# Patient Record
Sex: MALE | Race: BLACK OR AFRICAN AMERICAN
[De-identification: names, ages, dates, MRNs, and addresses within clinical notes are randomized per-mention and may not be internally consistent; named-entity substitution may affect disease eponyms.]

---

## 2017-02-07 ENCOUNTER — APPOINTMENT (OUTPATIENT)
Dept: UROLOGY | Facility: CLINIC | Age: 62
End: 2017-02-07

## 2017-02-07 DIAGNOSIS — N52.9 MALE ERECTILE DYSFUNCTION, UNSPECIFIED: ICD-10-CM

## 2018-02-06 ENCOUNTER — APPOINTMENT (OUTPATIENT)
Dept: UROLOGY | Facility: CLINIC | Age: 63
End: 2018-02-06
Payer: MEDICAID

## 2018-02-06 VITALS
HEIGHT: 70 IN | WEIGHT: 170 LBS | DIASTOLIC BLOOD PRESSURE: 69 MMHG | SYSTOLIC BLOOD PRESSURE: 109 MMHG | RESPIRATION RATE: 15 BRPM | TEMPERATURE: 98 F | HEART RATE: 69 BPM | BODY MASS INDEX: 24.34 KG/M2

## 2018-02-06 PROCEDURE — 99214 OFFICE O/P EST MOD 30 MIN: CPT

## 2018-02-08 LAB — PSA SERPL-MCNC: 0.01 NG/ML

## 2019-02-05 ENCOUNTER — APPOINTMENT (OUTPATIENT)
Dept: UROLOGY | Facility: CLINIC | Age: 64
End: 2019-02-05
Payer: MEDICAID

## 2019-02-05 PROCEDURE — 99214 OFFICE O/P EST MOD 30 MIN: CPT

## 2019-02-05 NOTE — LETTER BODY
[FreeTextEntry1] : Maylin Covarrubias MD\par [address]\[place] [address]\par \par Dear Dr. Covarrubias,\par \par Román Brambila returns to the office today. He is a 63-year-old man status post radical prostatectomy for treatment of clinically localized prostate cancer performed in 2011. He has remained without any evidence of disease recurrence. He recently had his PSA checked in early January this year which was undetectable. He has very good urinary control without requirement for any pads and there is no incontinence. He has had some residual issues with erectile dysfunction after the prostatectomy. In the past he has used both oral medication as well as injectable medication but clearly had better results with the injection therapy. He is still interested in potentially having treatment with injection therapy but has not used it in some time.\par \par The patient reports a normal appetite. He denies abdominal pain or flank pain. He has no fevers or chills and denies any nausea or vomiting. He has no urinary complaints such as hematuria, dysuria, urgency, frequency, or incontinence. His weight is stable. There has been no unintentional weight loss. He has recently started cholesterol medication related to recently performed blood work showing elevation of the cholesterol level.\par \par We reviewed his PSA level today which is undetectable. He should have this done on an annual basis. We had a detailed discussion regarding management of her recti dysfunction and he would like to try the injection therapy again. I have given him a prescription for this and he will return to the office for injection teaching as he is unsure if he remembers exactly how to perform the self  administration. I will see him back at that time and otherwise I will see him back annually for the prostate cancer followup.\par \par Please do not hesitate to contact me with any questions or concerns.\par \par Sincerely,\par \par \par \par \par Mateo Tillman MD, FACS\par  of Urology\par [facility]\par \par [facility]\par Director of Robotics and Minimally Invasive Surgery\par [address] [place] [address]\[place] [address]\par P: [phone]\par F: [phone]\par [email or web address]\par

## 2020-02-11 ENCOUNTER — APPOINTMENT (OUTPATIENT)
Dept: UROLOGY | Facility: CLINIC | Age: 65
End: 2020-02-11

## 2020-02-28 ENCOUNTER — APPOINTMENT (OUTPATIENT)
Dept: UROLOGY | Facility: CLINIC | Age: 65
End: 2020-02-28
Payer: MEDICAID

## 2020-02-28 PROCEDURE — 99213 OFFICE O/P EST LOW 20 MIN: CPT

## 2021-02-16 ENCOUNTER — APPOINTMENT (OUTPATIENT)
Dept: UROLOGY | Facility: CLINIC | Age: 66
End: 2021-02-16

## 2021-03-12 ENCOUNTER — APPOINTMENT (OUTPATIENT)
Dept: UROLOGY | Facility: CLINIC | Age: 66
End: 2021-03-12
Payer: MEDICAID

## 2021-03-12 PROCEDURE — 99213 OFFICE O/P EST LOW 20 MIN: CPT

## 2021-03-12 PROCEDURE — 99072 ADDL SUPL MATRL&STAF TM PHE: CPT

## 2021-03-12 RX ORDER — PAPAVERINE HYDROCHLORIDE 30 MG/ML
30 INJECTION, SOLUTION INTRAVENOUS
Qty: 2.5 | Refills: 11 | Status: COMPLETED | COMMUNITY
Start: 2019-02-05 | End: 2021-03-12

## 2021-03-12 NOTE — LETTER BODY
[FreeTextEntry1] : Maylin Covarrubias MD\par [address]\[place] [address]\par \par Dear Dr. Covarrubias,\par \par Román Brambila returns to the office today.  He is a 65-year-old man with history of prostate cancer.  He underwent radical prostatectomy in 2011 and has been free of any disease recurrence since that time with undetectable PSAs.  His most recent PSA was done in February of this year.  He has had good recovery of his urinary control and erectile dysfunction.  He continues to have no leakage of urine but says that since he underwent an emergency appendectomy when he was traveling in [place], he developed reduction of his libido and erectile dysfunction again.  He has not been on any medication since that procedure for erectile dysfunction.the patient did also develop what sounds to be a DVT after his appendectomy and was treated with Xarelto for about 6 months which he is no longer using.\par \par He reports continued lack of good erectile function.  He does have some partial erections if he tries to stimulate the penis but nothing that he would deem adequate for penetration.\par \par The patient continues to be free of prostate cancer recurrence.  It is unclear how his appendectomy may be associated with the erectile dysfunction, but the patient clearly has the association and time without surgery and postoperative erectile dysfunction.  He would like to see a specialist with men's sexual health for further discussion and evaluation.  I have referred him to a colleague for that purpose.\par \par Sincerely,\par \par \par \par \par Mateo Tillman MD, FACS\par  of Urology\par Residency \par [facility]\par \par [facility]\par Director of Robotics and Minimally Invasive Surgery\par [address] [place] [address]\[place] [address]\par P: [phone]\par F: [phone]\par [facility]\par \par

## 2021-04-30 ENCOUNTER — APPOINTMENT (OUTPATIENT)
Dept: UROLOGY | Facility: CLINIC | Age: 66
End: 2021-04-30
Payer: MEDICAID

## 2021-04-30 VITALS
HEART RATE: 72 BPM | BODY MASS INDEX: 26.07 KG/M2 | TEMPERATURE: 97.3 F | SYSTOLIC BLOOD PRESSURE: 133 MMHG | OXYGEN SATURATION: 95 % | DIASTOLIC BLOOD PRESSURE: 78 MMHG | HEIGHT: 68 IN | WEIGHT: 172 LBS

## 2021-04-30 PROCEDURE — 99072 ADDL SUPL MATRL&STAF TM PHE: CPT

## 2021-04-30 PROCEDURE — 99214 OFFICE O/P EST MOD 30 MIN: CPT

## 2021-04-30 NOTE — HISTORY OF PRESENT ILLNESS
[FreeTextEntry1] :  Had prostatectomy 12 years ago with Dr. Tillman \par had appendectomy 4 years ago \par DVT back then - treated for 6 months \par used to use injections in the past but nothing for last 3 years\par \par had left testis mass

## 2021-04-30 NOTE — ASSESSMENT
[FreeTextEntry1] : ED\par - try cialis 5/20 \par - DUS\par - trimix send \par - scrotal US for left epididymal cyst ?\par \par The submitted E/M billing level for this visit reflects the total time spent on the day of the visit including documentation in EMR, face-to-face time spent with the patient, non-face-to-face review of medical records and relevant information, review of laboratory results available via [employer] portal, as well using a patient’s electronic medical records portal for patients with records not available to [facility] directly. \par \par Time spend counseling and performing coordination of care was also included in determining the appropriate EM billing level.\par \par I have reviewed and verified information regarding the chief complaint and history recorded by the ancillary staff and/or the patient. I have independently reviewed and interpreted tests performed by other physicians and facilities as necessary. I have reviewed images pertinent to providing the care to  the patient.  \par \par I have discussed with the patient differential diagnosis, reason for auxiliary tests if ordered, risks, benefits, alternatives, and complications of each form of therapy included surgical therapy. Off label use of most of medications used in andrology was reviewed. \par \par  \par

## 2021-04-30 NOTE — ASSESSMENT
[FreeTextEntry1] : ED\par - try cialis 5/20 \par - DUS\par - trimix send \par - scrotal US for left epididymal cyst ?\par \par The submitted E/M billing level for this visit reflects the total time spent on the day of the visit including documentation in EMR, face-to-face time spent with the patient, non-face-to-face review of medical records and relevant information, review of laboratory results available via [employer] portal, as well using a patient’s electronic medical records portal for patients with records not available to [facility] directly. \par \par Time spend counseling and performing coordination of care was also included in determining the appropriate EM billing level.\par \par I have reviewed and verified information regarding the chief complaint and history recorded by the ancillary staff and/or the patient. I have independently reviewed and interpreted tests performed by other physicians and facilities as necessary. I have reviewed images pertinent to providing the care to  the patient.  \par \par I have discussed with the patient differential diagnosis, reason for auxiliary tests if ordered, risks, benefits, alternatives, and complications of each form of therapy included surgical therapy. Off label use of most of medications used in andrology was reviewed. \par \par  \par

## 2021-06-01 ENCOUNTER — NON-APPOINTMENT (OUTPATIENT)
Age: 66
End: 2021-06-01

## 2021-06-21 ENCOUNTER — APPOINTMENT (OUTPATIENT)
Dept: UROLOGY | Facility: CLINIC | Age: 66
End: 2021-06-21
Payer: MEDICAID

## 2021-06-21 VITALS
HEART RATE: 70 BPM | TEMPERATURE: 97.1 F | OXYGEN SATURATION: 98 % | DIASTOLIC BLOOD PRESSURE: 81 MMHG | SYSTOLIC BLOOD PRESSURE: 134 MMHG

## 2021-06-21 DIAGNOSIS — N43.3 HYDROCELE, UNSPECIFIED: ICD-10-CM

## 2021-06-21 PROCEDURE — 93980 PENILE VASCULAR STUDY: CPT

## 2021-06-21 PROCEDURE — 54235 NJX CORPORA CAVERNOSA RX AGT: CPT

## 2021-06-21 PROCEDURE — J3490T: CUSTOM | Mod: NC

## 2021-06-25 ENCOUNTER — NON-APPOINTMENT (OUTPATIENT)
Age: 66
End: 2021-06-25

## 2021-07-19 ENCOUNTER — APPOINTMENT (OUTPATIENT)
Dept: UROLOGY | Facility: CLINIC | Age: 66
End: 2021-07-19
Payer: MEDICAID

## 2021-07-19 VITALS
HEART RATE: 72 BPM | HEIGHT: 70 IN | WEIGHT: 165 LBS | OXYGEN SATURATION: 100 % | BODY MASS INDEX: 23.62 KG/M2 | SYSTOLIC BLOOD PRESSURE: 136 MMHG | DIASTOLIC BLOOD PRESSURE: 79 MMHG

## 2021-07-19 PROCEDURE — 99213 OFFICE O/P EST LOW 20 MIN: CPT

## 2021-07-19 RX ORDER — AZITHROMYCIN 250 MG/1
250 TABLET, FILM COATED ORAL
Qty: 6 | Refills: 0 | Status: COMPLETED | COMMUNITY
Start: 2021-05-07 | End: 2021-07-19

## 2021-07-19 NOTE — ASSESSMENT
[FreeTextEntry1] : Explained can use Trimix up to 20u \par \par Explained penile anatomy and safe zones to inject.\par \par Refilled Trimix.\par \par The submitted E/M billing level for this visit reflects the total time spent on the day of the visit including face-to-face time spent with the patient, non-face-to-face review of medical records and relevant information, documentation, and asynchronous communication with the patient after a visit via phone, email, or patient’s eHR portal after the visit.  \par \par The medical records reviewed are either scanned into the chart or reviewed with the patient using a patient’s electronic medical records portal for patients with records not available to [facility] via electronic transmission platforms from other institutions and labs. \par \par Time spend counseling and performing coordination of care was also included in determining the appropriate EM billing level.\par \par I have reviewed and verified information regarding the chief complaint and history recorded by the ancillary staff and/or the patient. I have independently reviewed and interpreted tests performed by other physicians and facilities as necessary. \par \par I have discussed with the patient differential diagnosis, reason for auxiliary tests if ordered, risks, benefits, alternatives, and complications of each form of therapy were discussed. \par \par Intracavernosal penile injection teaching.\par Patient is here for the penile injection teaching.  He failed oral therapy for erectile dysfunction.\par \par We reviewed risks, benefits, alternatives, complications and the procedure itself.\par \par Specifically we discussed with the patient that the medication used for intracavernosal injection therapy consists of 3 or 4 different medication, specifically prostaglandin E1, papaverine, phentolamine, and occasionally very low dose of atropine.  The medication for penile injection therapy is prepared by the pharmacy.  Only special pharmacy is prepared at medication.  Each of the medication dilates vessels in the penis using different mechanisms.  Only one of the components of that preparation is approved by FDA for penile injection therapy however it has to be used in higher dose which can increase pain and intracavernosal scarring.  The approved component of the Trimix is prostaglandin E1.  It is also known as Caverject.\par \par The medication is injected by the patient in the shaft of the penis on the left or right side.  The appropriate site of the infection injection was demonstrated on the model of the penis.  Patient should not inject into the dorsal aspect of the penis or underneath the penis as he may inject into the vessels or urethra.\par \par The risk of prolonged correction, also numbness priapism was explained to the patient.  If patient has erection more than 2 hours he should take from 2-4 Sudafed's of 30 mg each total 120 mg.  Maximum dose of Sudafed per days 240 mg.  If the erection pressure persists still after Sudafed he needs to contact our office within working hours or go to the nearest ER.\par \par Patient should never have erection longer than 4 hours.  Irreversible damage to the penis can occur if priapism persists.  Patient understood the risk of priapism.\par \par Prescription was given.  Prescription for syringes was given as well.  Patient will contact us with any questions.  \par \par Total time spend teaching the patient 35 min

## 2021-07-19 NOTE — HISTORY OF PRESENT ILLNESS
[FreeTextEntry1] : ANKIT ARRIETA, a 65 year old male, presented to the office for a follow up regarding his erectile dysfunction.\par \par Said he went home from last visit and injected 10u of TriMix and did not work the first two times.\par \par The third time he tried he said it lasted 45 minutes.\par \par

## 2022-01-25 ENCOUNTER — APPOINTMENT (OUTPATIENT)
Dept: UROLOGY | Facility: CLINIC | Age: 67
End: 2022-01-25
Payer: MEDICAID

## 2022-01-25 VITALS
HEART RATE: 71 BPM | BODY MASS INDEX: 24.11 KG/M2 | DIASTOLIC BLOOD PRESSURE: 86 MMHG | SYSTOLIC BLOOD PRESSURE: 145 MMHG | OXYGEN SATURATION: 98 % | RESPIRATION RATE: 16 BRPM | WEIGHT: 168 LBS

## 2022-01-25 PROCEDURE — 99213 OFFICE O/P EST LOW 20 MIN: CPT

## 2022-01-25 NOTE — HISTORY OF PRESENT ILLNESS
[FreeTextEntry1] : ANKIT ARRIETA, a 66 year old male, presented to the office for a follow up regarding his erectile dysfunction.\par \par Patient using 20u of Trimix "with great results."\par \par Ruddy managing PSA

## 2022-01-25 NOTE — ASSESSMENT
[FreeTextEntry1] : ED\par -Trimix 20u working well\par -no reported scar tissue\par -rotates sites as directed\par -no refill needed - states has 1 refill left \par \par - refill done\par - explained that he doesn’t need to take Sudafed every time just if he has prolonged erection > 2 h\par \par I saw and evaluated the patient with nurse practitioner Rosendo Harley. \par I have confirmed the chief complaint, past medical, surgical, and social history.\par I have examined the patient. \par I have reviewed the note and interpreted auxiliary tests results. \par I have formulated the assessment and plan and discussed my recommendations of care to the nurse practitioner.\par I agree with the findings and the plan of care as documented by this  note which I edited as necessary.\par \par \par The submitted E/M billing level for this visit reflects the total time spent on the day of the visit including face-to-face time spent with the patient, non-face-to-face review of medical records and relevant information, documentation, and asynchronous communication with the patient after a visit via phone, email, or patient’s eHR portal after the visit.  \par \par The medical records reviewed are either scanned into the chart or reviewed with the patient using a patient’s electronic medical records portal for patients with records not available to [facility] via electronic transmission platforms from other institutions and labs. \par \par Time spend counseling and performing coordination of care was also included in determining the appropriate EM billing level.\par \par I have reviewed and verified information regarding the chief complaint and history recorded by the ancillary staff and/or the patient. I have independently reviewed and interpreted tests performed by other physicians and facilities as necessary. \par \par I have discussed with the patient differential diagnosis, reason for auxiliary tests if ordered, risks, benefits, alternatives, and complications of each form of therapy were discussed.

## 2022-04-08 ENCOUNTER — APPOINTMENT (OUTPATIENT)
Dept: UROLOGY | Facility: CLINIC | Age: 67
End: 2022-04-08
Payer: MEDICAID

## 2022-04-08 VITALS
TEMPERATURE: 97.6 F | SYSTOLIC BLOOD PRESSURE: 131 MMHG | HEART RATE: 86 BPM | DIASTOLIC BLOOD PRESSURE: 89 MMHG | BODY MASS INDEX: 22.62 KG/M2 | HEIGHT: 70 IN | WEIGHT: 158 LBS | RESPIRATION RATE: 17 BRPM

## 2022-04-08 VITALS
WEIGHT: 155 LBS | HEIGHT: 70 IN | HEART RATE: 78 BPM | TEMPERATURE: 97.6 F | DIASTOLIC BLOOD PRESSURE: 83 MMHG | RESPIRATION RATE: 17 BRPM | BODY MASS INDEX: 22.19 KG/M2 | SYSTOLIC BLOOD PRESSURE: 156 MMHG

## 2022-04-08 PROCEDURE — 99214 OFFICE O/P EST MOD 30 MIN: CPT

## 2022-04-08 RX ORDER — ASPIRIN 325 MG/1
TABLET, FILM COATED ORAL
Refills: 0 | Status: COMPLETED | COMMUNITY
End: 2022-04-08

## 2022-04-08 RX ORDER — CHOLECALCIFEROL (VITAMIN D3) 1250 MCG
1.25 MG CAPSULE ORAL
Refills: 0 | Status: ACTIVE | COMMUNITY

## 2022-04-08 RX ORDER — SIMVASTATIN 80 MG/1
TABLET, FILM COATED ORAL
Refills: 0 | Status: COMPLETED | COMMUNITY
End: 2022-04-08

## 2022-04-08 RX ORDER — TADALAFIL 5 MG/1
5 TABLET ORAL
Qty: 30 | Refills: 6 | Status: COMPLETED | OUTPATIENT
Start: 2021-04-30 | End: 2022-04-08

## 2022-04-08 RX ORDER — TADALAFIL 20 MG/1
20 TABLET ORAL
Qty: 30 | Refills: 6 | Status: COMPLETED | OUTPATIENT
Start: 2021-04-30 | End: 2022-04-08

## 2022-08-06 LAB
TESTOST BND SERPL-MCNC: 8.2 PG/ML
TESTOST SERPL-MCNC: 444.5 NG/DL

## 2023-01-26 ENCOUNTER — APPOINTMENT (OUTPATIENT)
Dept: UROLOGY | Facility: CLINIC | Age: 68
End: 2023-01-26

## 2023-01-31 ENCOUNTER — APPOINTMENT (OUTPATIENT)
Dept: UROLOGY | Facility: CLINIC | Age: 68
End: 2023-01-31
Payer: MEDICARE

## 2023-01-31 DIAGNOSIS — N50.3 CYST OF EPIDIDYMIS: ICD-10-CM

## 2023-01-31 NOTE — HISTORY OF PRESENT ILLNESS
[FreeTextEntry1] : ED follow up \par \par On ICI with trimix with good results \par no prolonged erections \par no scarring \par \par \par  Had prostatectomy 12 years ago with Dr. Tillman \par had appendectomy 4 years ago \par DVT back then - treated for 6 months \par used to use injections in the past but nothing for last 3 years\par \par had left testis mass

## 2023-02-07 ENCOUNTER — APPOINTMENT (OUTPATIENT)
Dept: UROLOGY | Facility: CLINIC | Age: 68
End: 2023-02-07
Payer: MEDICARE

## 2023-02-07 VITALS
OXYGEN SATURATION: 99 % | HEART RATE: 86 BPM | SYSTOLIC BLOOD PRESSURE: 143 MMHG | DIASTOLIC BLOOD PRESSURE: 73 MMHG | RESPIRATION RATE: 18 BRPM | TEMPERATURE: 98 F

## 2023-02-07 DIAGNOSIS — R39.15 URGENCY OF URINATION: ICD-10-CM

## 2023-02-07 PROCEDURE — 99214 OFFICE O/P EST MOD 30 MIN: CPT

## 2023-02-07 RX ORDER — PAPAVER/PHENTOLAMINE/ALPROSTAD 150-5-50
150-5-50 VIAL (EA) INTRACAVERNOSAL
Qty: 1 | Refills: 11 | Status: COMPLETED | COMMUNITY
Start: 2021-07-19 | End: 2023-02-07

## 2023-02-07 NOTE — ASSESSMENT
[FreeTextEntry1] : ED\par -Trimix 20u working well\par -no reported scar tissue\par -rotates sites as directed\par - needs refills for a year \par \par Patient also noticed some urinary urgency which is new to him.  He has no fever no chills.  He has no urinary incontinence.  We will check his urine and urine culture.  If this is due to infection I will treat it otherwise he will discuss with Dr. Tillman during his follow-up in April any further evaluation.  He will see me for erectile dysfunction only.  Follow-up in a year or earlier if he has any issues with achieving adequate erection for penetration.\par

## 2023-02-07 NOTE — PHYSICAL EXAM
[Urethral Meatus] : meatus normal [Penis Abnormality] : normal uncircumcised penis [Urinary Bladder Findings] : the bladder was normal on palpation [Scrotum] : the scrotum was normal [Testes Mass (___cm)] : there were no testicular masses

## 2023-02-07 NOTE — HISTORY OF PRESENT ILLNESS
[FreeTextEntry1] : The patient was seen and examined with CASPER George in the room. \par \par ED follow up \par \par On ICI with trimix with good results 20 units\par no prolonged erections \par no scarring \par noticed urgency\par \par  Had prostatectomy 12 years ago with Dr. Tillman \par had appendectomy 4 years ago \par DVT back then - treated for 6 months \par used to use injections in the past but nothing for last 3 years\par \par had left testis mass

## 2023-02-09 LAB
APPEARANCE: CLEAR
BILIRUBIN URINE: NEGATIVE
BLOOD URINE: NEGATIVE
COLOR: NORMAL
GLUCOSE QUALITATIVE U: NEGATIVE
KETONES URINE: NEGATIVE
LEUKOCYTE ESTERASE URINE: NEGATIVE
NITRITE URINE: NEGATIVE
PH URINE: 6.5
PROTEIN URINE: NORMAL
SPECIFIC GRAVITY URINE: 1.02
UROBILINOGEN URINE: NORMAL

## 2023-04-11 ENCOUNTER — APPOINTMENT (OUTPATIENT)
Dept: UROLOGY | Facility: CLINIC | Age: 68
End: 2023-04-11
Payer: MEDICARE

## 2023-04-11 VITALS
HEIGHT: 70 IN | HEART RATE: 67 BPM | OXYGEN SATURATION: 98 % | BODY MASS INDEX: 24.48 KG/M2 | WEIGHT: 171 LBS | SYSTOLIC BLOOD PRESSURE: 105 MMHG | TEMPERATURE: 98 F | DIASTOLIC BLOOD PRESSURE: 68 MMHG

## 2023-04-11 DIAGNOSIS — R39.15 URGENCY OF URINATION: ICD-10-CM

## 2023-04-11 PROCEDURE — 99214 OFFICE O/P EST MOD 30 MIN: CPT

## 2023-04-12 PROBLEM — R39.15 URINARY URGENCY: Status: ACTIVE | Noted: 2023-04-12

## 2023-04-12 NOTE — HISTORY OF PRESENT ILLNESS
[FreeTextEntry1] : Román Brambila returns to the office today.  He is a 67-year-old man here today for routine follow-up on prostate cancer.  He had undergone radical prostatectomy in 2011.  He has not required any additional treatment and his PSA levels have been routinely followed.  His PSA has been undetectable since his surgery.  He has good urinary control but does have some occasional urgency and urge incontinence.  He denies any incontinence associated with Valsalva.  He has been using Trimix as penile injection therapy for many years with good results and continues to use this without any challenges.  Reports no difficulties with injections or scar tissue.  No penile curvature.\par \par

## 2023-04-12 NOTE — LETTER GREETING
[Dear  ___] : Dear  [unfilled], [Follow-Up] : Your patient, [unfilled] was seen in my office today for follow-up [Please see my note below.] : Please see my note below. [FreeTextEntry2] : Maylin Covarrubias MD\par [address]\par [place] [id number]

## 2023-04-12 NOTE — DISEASE MANAGEMENT
[Biopsy] : Patient had a biopsy on [Biopsy results sent to PCP/Referring Physician] : Biopsy results sent to PCP/Referring Physician [IIB] : IIB [Radical Prostatectomy] : Radical Prostatectomy [Patient had a radical prostatectomy] : Patient had a radical prostatectomy  [7(3+4)] : Kyler Score 7(3+4) [Negative] : Negative margins [2] : T2 [0] : N0 [X] : MX [BiopsyDate] : 05/11 [MeasuredProstateVolume] : 28 [TotalCores] : 12 [TotalPositiveCores] : 2 [MaxCoreInvolvement] : 20 [RadicalProstatectomyDate] : 08/11 [TotalNumberofnodesresected] : 8 [PositiveNodes] : 0

## 2023-04-12 NOTE — LETTER CLOSING
[FreeTextEntry3] : Sincerely,\par \par \par \par \par Mateo Tillman MD, FACS\par Chief of Urology, [facility]\par  of Urology\par Director of Robotic and Laparoscopic Surgery\par [facility]\par \par [facility]\par [address] [place] [address]\[place] [address]\par P: [phone]\par F: [phone]\par [facility]

## 2023-04-14 LAB — PSA SERPL-MCNC: 0.02 NG/ML

## 2023-06-07 RX ORDER — PAPAVER/PHENTOLAMINE/ALPROSTAD 150-5-50
150-5-50 VIAL (EA) INTRACAVERNOSAL
Qty: 1 | Refills: 0 | Status: ACTIVE | COMMUNITY
Start: 2021-04-30 | End: 1900-01-01

## 2024-02-13 ENCOUNTER — APPOINTMENT (OUTPATIENT)
Dept: UROLOGY | Facility: CLINIC | Age: 69
End: 2024-02-13

## 2024-04-08 PROBLEM — N52.31 ERECTILE DYSFUNCTION FOLLOWING RADICAL PROSTATECTOMY: Status: ACTIVE | Noted: 2017-02-07

## 2024-04-08 NOTE — DISEASE MANAGEMENT
[Biopsy] : Patient had a biopsy on [Biopsy results sent to PCP/Referring Physician] : Biopsy results sent to PCP/Referring Physician [BiopsyDate] : 05/11 [MeasuredProstateVolume] : 28 [TotalCores] : 12 [TotalPositiveCores] : 2 [MaxCoreInvolvement] : 20 [IIB] : IIB [Radical Prostatectomy] : Radical Prostatectomy [Patient had a radical prostatectomy] : Patient had a radical prostatectomy  [7(3+4)] : Kyler Score 7(3+4) [Negative] : Negative margins [2] : T2 [0] : N0 [X] : MX [TotalNumberofnodesresected] : 8 [RadicalProstatectomyDate] : 08/11 [PositiveNodes] : 0

## 2024-04-08 NOTE — HISTORY OF PRESENT ILLNESS
[FreeTextEntry1] : Román Brambila returns to the office today.  He is 68 years old and in 2011 he underwent radical prostatectomy to address localized prostate cancer, [place] 3+4 disease.  He has remained with undetectable PSA level since surgery and is back today for routine follow-up.  He has typically had good urinary control although very occasional issues with urgency and urge associated incontinence.  There does not seem to be any stress component as he denies any leakage associated with Valsalva.  Current sexual function: Continuing to use intracavernosal injection therapy with Trimix as needed with good results and no complaints.

## 2024-04-08 NOTE — LETTER CLOSING
[FreeTextEntry3] : Sincerely,\par  \par  \par  \par  \par  Mateo Tillman MD, FACS\par  Chief of Urology, [facility]\par   of Urology\par  Director of Robotic and Laparoscopic Surgery\par  [facility] at [facility]\par  \par  [facility]\par  [address] [place] [address]\[place] [address]\par  P: [phone]\par  F: [phone]\par  [facility]

## 2024-04-08 NOTE — LETTER GREETING
[Dear  ___] : Dear  [unfilled], [Follow-Up] : Your patient, [unfilled] was seen in my office today for follow-up [Please see my note below.] : Please see my note below. [FreeTextEntry2] : Maylin Covarrubias MD\par  [address]\par  [place] [id number]

## 2024-04-09 ENCOUNTER — APPOINTMENT (OUTPATIENT)
Dept: UROLOGY | Facility: CLINIC | Age: 69
End: 2024-04-09

## 2024-04-09 DIAGNOSIS — Z85.46 PERSONAL HISTORY OF MALIGNANT NEOPLASM OF PROSTATE: ICD-10-CM

## 2024-04-09 DIAGNOSIS — N52.31 ERECTILE DYSFUNCTION FOLLOWING RADICAL PROSTATECTOMY: ICD-10-CM

## 2024-09-16 NOTE — HISTORY OF PRESENT ILLNESS
[FreeTextEntry1] : Román Brambila returns to the office today.  He is 68 years old and in 2011 he underwent radical prostatectomy to address localized prostate cancer, [place] 3+4 disease.  He has remained with undetectable PSA level since surgery and is back today for routine follow-up.  He has typically had good urinary control although very occasional issues with urgency and urge associated incontinence.  There does not seem to be any stress component as he denies any leakage associated with Valsalva.  Current sexual function: Continuing to use intracavernosal injection therapy with Trimix as needed with good results and no complaints.

## 2024-09-16 NOTE — DISEASE MANAGEMENT
[BiopsyDate] : 05/11 [MeasuredProstateVolume] : 28 [TotalCores] : 12 [TotalPositiveCores] : 2 [MaxCoreInvolvement] : 20 [RadicalProstatectomyDate] : 08/11 [TotalNumberofnodesresected] : 8 [PositiveNodes] : 0

## 2024-09-16 NOTE — LETTER CLOSING
[FreeTextEntry3] : Sincerely,\par  \par  \par  \par  \par  Mateo Tillman MD, FACS\par  Chief of Urology, [facility]\par   of Urology\par  Director of Robotic and Laparoscopic Surgery\par  [facility] at [facility]\par  \par  [facility]\par  [address] [place] [address]\[place] [address]\par  P: [phone]\par  F: [phone]\par  [facility]

## 2024-09-17 ENCOUNTER — APPOINTMENT (OUTPATIENT)
Dept: UROLOGY | Facility: CLINIC | Age: 69
End: 2024-09-17

## 2024-09-17 DIAGNOSIS — N52.31 ERECTILE DYSFUNCTION FOLLOWING RADICAL PROSTATECTOMY: ICD-10-CM

## 2024-09-17 DIAGNOSIS — Z85.46 PERSONAL HISTORY OF MALIGNANT NEOPLASM OF PROSTATE: ICD-10-CM

## 2025-03-18 ENCOUNTER — APPOINTMENT (OUTPATIENT)
Dept: UROLOGY | Facility: CLINIC | Age: 70
End: 2025-03-18
Payer: MEDICARE

## 2025-03-18 VITALS
DIASTOLIC BLOOD PRESSURE: 70 MMHG | HEIGHT: 70 IN | BODY MASS INDEX: 23.77 KG/M2 | HEART RATE: 83 BPM | SYSTOLIC BLOOD PRESSURE: 115 MMHG | OXYGEN SATURATION: 97 % | WEIGHT: 166 LBS

## 2025-03-18 DIAGNOSIS — N52.31 ERECTILE DYSFUNCTION FOLLOWING RADICAL PROSTATECTOMY: ICD-10-CM

## 2025-03-18 DIAGNOSIS — R31.29 OTHER MICROSCOPIC HEMATURIA: ICD-10-CM

## 2025-03-18 DIAGNOSIS — Z85.46 PERSONAL HISTORY OF MALIGNANT NEOPLASM OF PROSTATE: ICD-10-CM

## 2025-03-18 LAB
APPEARANCE: CLEAR
BACTERIA: NEGATIVE /HPF
BILIRUBIN URINE: NEGATIVE
BLOOD URINE: ABNORMAL
CAST: 0 /LPF
COLOR: YELLOW
EPITHELIAL CELLS: 0 /HPF
GLUCOSE QUALITATIVE U: NEGATIVE MG/DL
KETONES URINE: NEGATIVE MG/DL
LEUKOCYTE ESTERASE URINE: NEGATIVE
MICROSCOPIC-UA: NORMAL
NITRITE URINE: NEGATIVE
PH URINE: 6
PROTEIN URINE: NEGATIVE MG/DL
RED BLOOD CELLS URINE: 4 /HPF
SPECIFIC GRAVITY URINE: 1.02
UROBILINOGEN URINE: 0.2 MG/DL
WHITE BLOOD CELLS URINE: 0 /HPF

## 2025-03-18 PROCEDURE — 99214 OFFICE O/P EST MOD 30 MIN: CPT

## 2025-03-18 PROCEDURE — G2211 COMPLEX E/M VISIT ADD ON: CPT

## 2025-03-19 LAB — BACTERIA UR CULT: NORMAL

## 2025-05-05 ENCOUNTER — APPOINTMENT (OUTPATIENT)
Dept: UROLOGY | Facility: CLINIC | Age: 70
End: 2025-05-05